# Patient Record
Sex: MALE | Race: OTHER | NOT HISPANIC OR LATINO | Employment: FULL TIME | URBAN - METROPOLITAN AREA
[De-identification: names, ages, dates, MRNs, and addresses within clinical notes are randomized per-mention and may not be internally consistent; named-entity substitution may affect disease eponyms.]

---

## 2017-01-04 ENCOUNTER — TRANSCRIBE ORDERS (OUTPATIENT)
Dept: ADMINISTRATIVE | Facility: HOSPITAL | Age: 62
End: 2017-01-04

## 2017-01-04 ENCOUNTER — HOSPITAL ENCOUNTER (OUTPATIENT)
Dept: RADIOLOGY | Facility: CLINIC | Age: 62
Discharge: HOME | End: 2017-01-04
Payer: COMMERCIAL

## 2017-01-04 ENCOUNTER — ALLSCRIPTS OFFICE VISIT (OUTPATIENT)
Dept: OTHER | Facility: OTHER | Age: 62
End: 2017-01-04

## 2017-01-04 DIAGNOSIS — S42.001A CLOSED FRACTURE OF RIGHT CLAVICLE: ICD-10-CM

## 2017-01-04 DIAGNOSIS — M75.21 BICIPITAL TENDINITIS OF RIGHT SHOULDER: ICD-10-CM

## 2017-01-04 DIAGNOSIS — S52.501A CLOSED FRACTURE OF LOWER END OF RIGHT RADIUS: ICD-10-CM

## 2017-01-04 DIAGNOSIS — S42.001K CLOSED NONDISPLACED FRACTURE OF RIGHT CLAVICLE WITH NONUNION, UNSPECIFIED PART OF CLAVICLE, SUBSEQUENT ENCOUNTER: Primary | ICD-10-CM

## 2017-01-04 DIAGNOSIS — S52.501K CLOSED FRACTURE OF DISTAL END OF RIGHT RADIUS WITH NONUNION, UNSPECIFIED FRACTURE MORPHOLOGY, SUBSEQUENT ENCOUNTER: ICD-10-CM

## 2017-01-04 PROCEDURE — 73110 X-RAY EXAM OF WRIST: CPT

## 2017-01-04 PROCEDURE — 73000 X-RAY EXAM OF COLLAR BONE: CPT

## 2017-01-15 ENCOUNTER — HOSPITAL ENCOUNTER (OUTPATIENT)
Dept: MRI IMAGING | Facility: HOSPITAL | Age: 62
Discharge: HOME | End: 2017-01-15
Attending: ORTHOPAEDIC SURGERY
Payer: COMMERCIAL

## 2017-01-15 DIAGNOSIS — M75.21 BICIPITAL TENDINITIS OF RIGHT SHOULDER: ICD-10-CM

## 2017-01-15 PROCEDURE — 73218 MRI UPPER EXTREMITY W/O DYE: CPT

## 2017-01-18 ENCOUNTER — ALLSCRIPTS OFFICE VISIT (OUTPATIENT)
Dept: OTHER | Facility: OTHER | Age: 62
End: 2017-01-18

## 2018-01-13 VITALS
WEIGHT: 113 LBS | DIASTOLIC BLOOD PRESSURE: 83 MMHG | BODY MASS INDEX: 21.34 KG/M2 | HEART RATE: 60 BPM | SYSTOLIC BLOOD PRESSURE: 143 MMHG | HEIGHT: 61 IN

## 2018-01-13 VITALS
DIASTOLIC BLOOD PRESSURE: 77 MMHG | BODY MASS INDEX: 21.34 KG/M2 | WEIGHT: 113 LBS | HEIGHT: 61 IN | HEART RATE: 69 BPM | SYSTOLIC BLOOD PRESSURE: 114 MMHG

## 2024-09-16 ENCOUNTER — APPOINTMENT (EMERGENCY)
Dept: RADIOLOGY | Facility: HOSPITAL | Age: 69
End: 2024-09-16
Payer: MEDICARE

## 2024-09-16 ENCOUNTER — HOSPITAL ENCOUNTER (EMERGENCY)
Facility: HOSPITAL | Age: 69
Discharge: HOME | End: 2024-09-16
Attending: EMERGENCY MEDICINE
Payer: MEDICARE

## 2024-09-16 VITALS
DIASTOLIC BLOOD PRESSURE: 65 MMHG | SYSTOLIC BLOOD PRESSURE: 145 MMHG | RESPIRATION RATE: 18 BRPM | WEIGHT: 112 LBS | BODY MASS INDEX: 21.16 KG/M2 | OXYGEN SATURATION: 99 % | HEART RATE: 69 BPM | TEMPERATURE: 98.1 F

## 2024-09-16 DIAGNOSIS — M62.838 NECK MUSCLE SPASM: Primary | ICD-10-CM

## 2024-09-16 PROCEDURE — 72050 X-RAY EXAM NECK SPINE 4/5VWS: CPT

## 2024-09-16 PROCEDURE — 99283 EMERGENCY DEPT VISIT LOW MDM: CPT

## 2024-09-16 PROCEDURE — 99284 EMERGENCY DEPT VISIT MOD MDM: CPT | Performed by: PHYSICIAN ASSISTANT

## 2024-09-16 RX ORDER — METHOCARBAMOL 500 MG/1
500 TABLET, FILM COATED ORAL 2 TIMES DAILY
Qty: 20 TABLET | Refills: 0 | Status: SHIPPED | OUTPATIENT
Start: 2024-09-16

## 2024-09-16 RX ORDER — ACETAMINOPHEN 325 MG/1
650 TABLET ORAL ONCE
Status: COMPLETED | OUTPATIENT
Start: 2024-09-16 | End: 2024-09-16

## 2024-09-16 RX ADMIN — ACETAMINOPHEN 650 MG: 325 TABLET ORAL at 13:07

## 2024-09-16 NOTE — ED PROVIDER NOTES
"1. Neck muscle spasm      ED Disposition       ED Disposition   Discharge    Condition   Stable    Date/Time   Mon Sep 16, 2024  1:57 PM    Comment   Cadesloanenoe Feldman discharge to home/self care.                   Assessment & Plan       Medical Decision Making  Amount and/or Complexity of Data Reviewed  Radiology: ordered.    Risk  OTC drugs.  Prescription drug management.      Differential diagnosis includes cervical strain, cervical spasm, less likely fracture given no fall or trauma.  I ordered a C-spine x-ray.  I independently interpreted as severe DJD but no acute osseous abnormality.  Patient is having no radicular symptoms.  He was given Tylenol p.o. in the ED.  Plan will be discharged home with prescription for Robaxin with precautions that may cause drowsiness.  Patient was advised he may apply intermittent warm compresses at home and over-the-counter Salonpas patch.  He was advised to follow-up with his primary care provider if not continuing to improve over next 2 to 3 days.  Return precautions were reviewed including increased pain or arm weakness/numbness/tingling.                 Medications   acetaminophen (TYLENOL) tablet 650 mg (650 mg Oral Given 9/16/24 1307)       History of Present Illness       HPI    Patient is a 70 yo [place] male who reports 7-10 day history of R neck pain radiating to trapezius ridge. No fall or trauma. Has been trying tylenol, motrin, icy hot and \"pain medication from [facility]al[facility]\". Daughter reports his range of motion of neck is improving. No radiation of pain, numbness, tingling down arms. No cp, sob. No fever. No other complaints    Review of Systems   Constitutional:  Negative for fever.   HENT:  Negative for sore throat.    Respiratory:  Negative for shortness of breath.    Cardiovascular:  Negative for chest pain.   Gastrointestinal:  Negative for abdominal pain.   Musculoskeletal:  Positive for neck pain. Negative for back pain.   Neurological:  Negative for numbness. "           Objective     ED Triage Vitals   Temperature Pulse Blood Pressure Respirations SpO2 Patient Position - Orthostatic VS   09/16/24 1205 09/16/24 1205 09/16/24 1205 09/16/24 1205 09/16/24 1205 --   98.1 °F (36.7 °C) 69 145/65 18 99 %       Temp src Heart Rate Source BP Location FiO2 (%) Pain Score    -- -- -- -- 09/16/24 1207        10 - Worst Possible Pain        Physical Exam  Vitals and nursing note reviewed.   Constitutional:       General: He is not in acute distress.     Appearance: Normal appearance. He is not ill-appearing, toxic-appearing or diaphoretic.   HENT:      Head: Normocephalic and atraumatic.      Right Ear: Tympanic membrane, ear canal and external ear normal.      Left Ear: Tympanic membrane, ear canal and external ear normal.      Nose: Nose normal.      Mouth/Throat:      Mouth: Mucous membranes are moist.      Pharynx: Oropharynx is clear.   Eyes:      Extraocular Movements: Extraocular movements intact.      Conjunctiva/sclera: Conjunctivae normal.      Pupils: Pupils are equal, round, and reactive to light.   Neck:      Comments: Right paracervical and trapezius ridge tenderness.  Cardiovascular:      Rate and Rhythm: Normal rate and regular rhythm.      Pulses: Normal pulses.      Heart sounds: Normal heart sounds.   Pulmonary:      Effort: Pulmonary effort is normal.      Breath sounds: Normal breath sounds.   Abdominal:      General: Abdomen is flat. Bowel sounds are normal.      Palpations: Abdomen is soft.   Musculoskeletal:         General: Normal range of motion.      Cervical back: Neck supple.   Skin:     General: Skin is warm and dry.      Capillary Refill: Capillary refill takes less than 2 seconds.   Neurological:      General: No focal deficit present.      Mental Status: He is alert and oriented to person, place, and time. Mental status is at baseline.         Labs Reviewed - No data to display  XR spine cervical complete 4 or 5 vw non injury    (Results Pending)        Procedures       Keron Martinez PA-C  09/16/24 1404

## 2024-09-16 NOTE — ED NOTES
"Pt arrived to ed with daughter reports right lateral neck pain with movement x 1 week no hx of injury or traumas. Has been using OTC creams and patches with some relief. \"It was hurting all the time now only when he moves\" no meds taken today for pain. Pt does not appear in any acute distress. 0/10 pain at rest 10/10 pain with movement     Altagracia Miranda RN  09/16/24 0342    "

## 2024-09-16 NOTE — DISCHARGE INSTRUCTIONS
Follow up with your primary care provider for recheck next 2-3 days if not continuing to improve    May apply salonpas patch     Robaxin for muscle spasm; use caution as can cause drowsiness    Intermittent warm compresses advised    Return to ED for increased pain, worsening symptoms